# Patient Record
Sex: MALE | URBAN - METROPOLITAN AREA
[De-identification: names, ages, dates, MRNs, and addresses within clinical notes are randomized per-mention and may not be internally consistent; named-entity substitution may affect disease eponyms.]

---

## 2020-11-09 ENCOUNTER — OFFICE VISIT (OUTPATIENT)
Dept: URBAN - METROPOLITAN AREA CLINIC 91 | Facility: CLINIC | Age: 25
End: 2020-11-09
Payer: COMMERCIAL

## 2020-11-09 DIAGNOSIS — H04.123 DRY EYE SYNDROME OF BILATERAL LACRIMAL GLANDS: Primary | ICD-10-CM

## 2020-11-09 PROCEDURE — 92002 INTRM OPH EXAM NEW PATIENT: CPT | Performed by: SPECIALIST

## 2020-11-09 ASSESSMENT — VISUAL ACUITY
OD: 20/20
OS: 20/20

## 2020-11-09 ASSESSMENT — INTRAOCULAR PRESSURE
OD: 15
OS: 16

## 2020-11-09 NOTE — IMPRESSION/PLAN
Impression: Dry eye syndrome of bilateral lacrimal glands: H04.123. Plan: For dry eye syndrome, I have recommended patient use a good quality artificial tear 4-6 times per day. I have also discussed alternatives such as placement of punctal plugs and topical Restasis drops. Patient has declined to get glasses as of today.